# Patient Record
Sex: FEMALE | Race: WHITE
[De-identification: names, ages, dates, MRNs, and addresses within clinical notes are randomized per-mention and may not be internally consistent; named-entity substitution may affect disease eponyms.]

---

## 2018-01-08 ENCOUNTER — HOSPITAL ENCOUNTER (EMERGENCY)
Dept: HOSPITAL 15 - ER | Age: 83
Discharge: HOME | End: 2018-01-08
Payer: COMMERCIAL

## 2018-01-08 VITALS — BODY MASS INDEX: 29.08 KG/M2 | HEIGHT: 62 IN | WEIGHT: 158 LBS

## 2018-01-08 VITALS — DIASTOLIC BLOOD PRESSURE: 60 MMHG | SYSTOLIC BLOOD PRESSURE: 165 MMHG

## 2018-01-08 DIAGNOSIS — Z90.49: ICD-10-CM

## 2018-01-08 DIAGNOSIS — I10: ICD-10-CM

## 2018-01-08 DIAGNOSIS — Z90.710: ICD-10-CM

## 2018-01-08 DIAGNOSIS — R06.02: ICD-10-CM

## 2018-01-08 DIAGNOSIS — M10.9: ICD-10-CM

## 2018-01-08 DIAGNOSIS — R20.0: Primary | ICD-10-CM

## 2018-01-08 LAB
ALBUMIN SERPL-MCNC: 3.8 G/DL (ref 3.4–5)
ALP SERPL-CCNC: 118 U/L (ref 45–117)
ALT SERPL-CCNC: 23 U/L (ref 13–56)
ANION GAP SERPL CALCULATED.3IONS-SCNC: 4 MMOL/L (ref 5–15)
APTT PPP: 29 SEC (ref 22.64–33.71)
BILIRUB SERPL-MCNC: 0.3 MG/DL (ref 0.2–1)
BUN SERPL-MCNC: 33 MG/DL (ref 7–18)
BUN/CREAT SERPL: 23.9
CALCIUM SERPL-MCNC: 8.9 MG/DL (ref 8.5–10.1)
CHLORIDE SERPL-SCNC: 109 MMOL/L (ref 98–107)
CO2 SERPL-SCNC: 27 MMOL/L (ref 21–32)
GLUCOSE SERPL-MCNC: 84 MG/DL (ref 74–106)
HCT VFR BLD AUTO: 45.4 % (ref 36–46)
HGB BLD-MCNC: 15.1 G/DL (ref 12.2–16.2)
INR PPP: 0.92 (ref 0.9–1.15)
MCH RBC QN AUTO: 30.8 PG (ref 28–32)
MCV RBC AUTO: 92.4 FL (ref 80–100)
NRBC BLD QL AUTO: 0.1 %
POTASSIUM SERPL-SCNC: 4 MMOL/L (ref 3.5–5.1)
PROT SERPL-MCNC: 8 G/DL (ref 6.4–8.2)
PROTHROMBIN TIME: 10 SEC (ref 9.37–12.3)
SODIUM SERPL-SCNC: 140 MMOL/L (ref 136–145)

## 2018-01-08 PROCEDURE — 83880 ASSAY OF NATRIURETIC PEPTIDE: CPT

## 2018-01-08 PROCEDURE — 85025 COMPLETE CBC W/AUTO DIFF WBC: CPT

## 2018-01-08 PROCEDURE — 85610 PROTHROMBIN TIME: CPT

## 2018-01-08 PROCEDURE — 36415 COLL VENOUS BLD VENIPUNCTURE: CPT

## 2018-01-08 PROCEDURE — 85730 THROMBOPLASTIN TIME PARTIAL: CPT

## 2018-01-08 PROCEDURE — 84484 ASSAY OF TROPONIN QUANT: CPT

## 2018-01-08 PROCEDURE — 93005 ELECTROCARDIOGRAM TRACING: CPT

## 2018-01-08 PROCEDURE — 80053 COMPREHEN METABOLIC PANEL: CPT

## 2018-01-08 PROCEDURE — 73030 X-RAY EXAM OF SHOULDER: CPT

## 2020-11-20 ENCOUNTER — HOSPITAL ENCOUNTER (INPATIENT)
Dept: HOSPITAL 15 - ER | Age: 85
LOS: 3 days | Discharge: HOME | DRG: 390 | End: 2020-11-23
Attending: FAMILY MEDICINE | Admitting: NURSE PRACTITIONER
Payer: COMMERCIAL

## 2020-11-20 VITALS — WEIGHT: 146.61 LBS | HEIGHT: 60 IN | BODY MASS INDEX: 28.78 KG/M2

## 2020-11-20 DIAGNOSIS — N28.9: ICD-10-CM

## 2020-11-20 DIAGNOSIS — I10: ICD-10-CM

## 2020-11-20 DIAGNOSIS — M10.9: ICD-10-CM

## 2020-11-20 DIAGNOSIS — Z88.8: ICD-10-CM

## 2020-11-20 DIAGNOSIS — Z88.5: ICD-10-CM

## 2020-11-20 DIAGNOSIS — K56.600: Primary | ICD-10-CM

## 2020-11-20 DIAGNOSIS — Z90.710: ICD-10-CM

## 2020-11-20 DIAGNOSIS — Z80.3: ICD-10-CM

## 2020-11-20 DIAGNOSIS — Z90.49: ICD-10-CM

## 2020-11-20 DIAGNOSIS — E86.0: ICD-10-CM

## 2020-11-20 LAB
ALBUMIN SERPL-MCNC: 4.4 G/DL (ref 3.4–5)
ALP SERPL-CCNC: 132 U/L (ref 45–117)
ALT SERPL-CCNC: 28 U/L (ref 13–56)
ANION GAP SERPL CALCULATED.3IONS-SCNC: 7 MMOL/L (ref 5–15)
BILIRUB SERPL-MCNC: 0.7 MG/DL (ref 0.2–1)
BUN SERPL-MCNC: 23 MG/DL (ref 7–18)
BUN/CREAT SERPL: 18.3
CALCIUM SERPL-MCNC: 10.2 MG/DL (ref 8.5–10.1)
CHLORIDE SERPL-SCNC: 106 MMOL/L (ref 98–107)
CO2 SERPL-SCNC: 27 MMOL/L (ref 21–32)
GLUCOSE SERPL-MCNC: 116 MG/DL (ref 74–106)
HCT VFR BLD AUTO: 43.1 % (ref 36–46)
HGB BLD-MCNC: 14.7 G/DL (ref 12.2–16.2)
MCH RBC QN AUTO: 31.4 PG (ref 28–32)
MCV RBC AUTO: 92.3 FL (ref 80–100)
NRBC BLD QL AUTO: 0.1 %
POTASSIUM SERPL-SCNC: 3.6 MMOL/L (ref 3.5–5.1)
PROT SERPL-MCNC: 8.3 G/DL (ref 6.4–8.2)
SODIUM SERPL-SCNC: 140 MMOL/L (ref 136–145)

## 2020-11-20 PROCEDURE — 96361 HYDRATE IV INFUSION ADD-ON: CPT

## 2020-11-20 PROCEDURE — 80053 COMPREHEN METABOLIC PANEL: CPT

## 2020-11-20 PROCEDURE — 87081 CULTURE SCREEN ONLY: CPT

## 2020-11-20 PROCEDURE — 74250 X-RAY XM SM INT 1CNTRST STD: CPT

## 2020-11-20 PROCEDURE — 96374 THER/PROPH/DIAG INJ IV PUSH: CPT

## 2020-11-20 PROCEDURE — 36415 COLL VENOUS BLD VENIPUNCTURE: CPT

## 2020-11-20 PROCEDURE — 96375 TX/PRO/DX INJ NEW DRUG ADDON: CPT

## 2020-11-20 PROCEDURE — 85730 THROMBOPLASTIN TIME PARTIAL: CPT

## 2020-11-20 PROCEDURE — 74176 CT ABD & PELVIS W/O CONTRAST: CPT

## 2020-11-20 PROCEDURE — 85025 COMPLETE CBC W/AUTO DIFF WBC: CPT

## 2020-11-20 PROCEDURE — 74018 RADEX ABDOMEN 1 VIEW: CPT

## 2020-11-20 PROCEDURE — 83690 ASSAY OF LIPASE: CPT

## 2020-11-20 PROCEDURE — 93005 ELECTROCARDIOGRAM TRACING: CPT

## 2020-11-20 PROCEDURE — 85610 PROTHROMBIN TIME: CPT

## 2020-11-20 RX ADMIN — SODIUM CHLORIDE SCH MLS/HR: 0.9 INJECTION, SOLUTION INTRAVENOUS at 21:30

## 2020-11-21 VITALS — DIASTOLIC BLOOD PRESSURE: 63 MMHG | SYSTOLIC BLOOD PRESSURE: 131 MMHG

## 2020-11-21 VITALS — DIASTOLIC BLOOD PRESSURE: 59 MMHG | SYSTOLIC BLOOD PRESSURE: 126 MMHG

## 2020-11-21 VITALS — SYSTOLIC BLOOD PRESSURE: 121 MMHG | DIASTOLIC BLOOD PRESSURE: 61 MMHG

## 2020-11-21 VITALS — DIASTOLIC BLOOD PRESSURE: 62 MMHG | SYSTOLIC BLOOD PRESSURE: 148 MMHG

## 2020-11-21 LAB
ALBUMIN SERPL-MCNC: 3.5 G/DL (ref 3.4–5)
ALP SERPL-CCNC: 106 U/L (ref 45–117)
ALT SERPL-CCNC: 22 U/L (ref 13–56)
ANION GAP SERPL CALCULATED.3IONS-SCNC: 7 MMOL/L (ref 5–15)
APTT PPP: 29.9 SEC (ref 23–31.2)
BILIRUB SERPL-MCNC: 0.7 MG/DL (ref 0.2–1)
BUN SERPL-MCNC: 21 MG/DL (ref 7–18)
BUN/CREAT SERPL: 18.1
CALCIUM SERPL-MCNC: 8.9 MG/DL (ref 8.5–10.1)
CHLORIDE SERPL-SCNC: 110 MMOL/L (ref 98–107)
CO2 SERPL-SCNC: 25 MMOL/L (ref 21–32)
GLUCOSE SERPL-MCNC: 114 MG/DL (ref 74–106)
HCT VFR BLD AUTO: 38.8 % (ref 36–46)
HGB BLD-MCNC: 13.2 G/DL (ref 12.2–16.2)
INR PPP: 1.01 (ref 0.9–1.15)
MCH RBC QN AUTO: 31.4 PG (ref 28–32)
MCV RBC AUTO: 92.7 FL (ref 80–100)
NRBC BLD QL AUTO: 0 %
POTASSIUM SERPL-SCNC: 3.6 MMOL/L (ref 3.5–5.1)
PROT SERPL-MCNC: 6.7 G/DL (ref 6.4–8.2)
SODIUM SERPL-SCNC: 142 MMOL/L (ref 136–145)

## 2020-11-21 RX ADMIN — ENOXAPARIN SODIUM SCH MG: 30 INJECTION SUBCUTANEOUS at 11:01

## 2020-11-21 RX ADMIN — SODIUM CHLORIDE SCH MLS/HR: 0.9 INJECTION, SOLUTION INTRAVENOUS at 14:49

## 2020-11-21 RX ADMIN — SODIUM CHLORIDE SCH MLS/HR: 0.9 INJECTION, SOLUTION INTRAVENOUS at 00:16

## 2020-11-21 RX ADMIN — ONDANSETRON HYDROCHLORIDE PRN MG: 2 INJECTION, SOLUTION INTRAMUSCULAR; INTRAVENOUS at 14:49

## 2020-11-21 RX ADMIN — ONDANSETRON HYDROCHLORIDE PRN MG: 2 INJECTION, SOLUTION INTRAMUSCULAR; INTRAVENOUS at 08:55

## 2020-11-21 RX ADMIN — PANTOPRAZOLE SODIUM SCH MG: 40 INJECTION, POWDER, FOR SOLUTION INTRAVENOUS at 11:00

## 2020-11-21 RX ADMIN — SODIUM CHLORIDE SCH MLS/HR: 0.9 INJECTION, SOLUTION INTRAVENOUS at 23:00

## 2020-11-21 NOTE — NUR
Patient has vomited most of the Gastrografin, and continues to have nausea that is resolved 
temporarily with zofran, will continue to monitor.

## 2020-11-21 NOTE — NUR
Opening Shift Note

Received report from Maria C NARANJO. Assumed care of patient, awake and alert.  No S/S of 
distress/SOB or pain.  Instructed on POC and to call for assist PRN. Fall precaution 
measures in place, will continue to monitor for changes Q1hr and PRN.

## 2020-11-21 NOTE — NUR
Opening Note

Assumed care of patient, she was walking back to bed with CNA, no assist needed, steady 
gate. She is A & O x4.  POC discussed, patient c/o vomiting bile. Will medicate per orders. 
Otherwise patient is comfortable at this time. Will continue to monitor PRN.

## 2020-11-21 NOTE — NUR
Telemetry admit from ER



SILVIADINORA SOUSA admitted to Telemetry unit after SBAR not received.  Patient oriented to 
Tiny Saenz, RN primary RN, unit, room, bed, and unit policies regarding patient care and 
visiting hours. Patient now on continuous telemetry monitoring, tele box #32  and telemetry 
reading on arrival to unit is SR 72 . Patient weighed by bedscale and encouraged to call if 
they need something. Safety measures in place, bed in lowest locked position, bed rails 
raised x2, call light within reach. All questions and concerns addressed, patient verbalized 
understanding.

## 2020-11-21 NOTE — NUR
Dr. Casillas at bedside.

Orders for fluids and DNR, received, read back and verified. No home meds added at this 
time, patient still NPO and vomiting. Will continue to monitor

## 2020-11-22 VITALS — SYSTOLIC BLOOD PRESSURE: 147 MMHG | DIASTOLIC BLOOD PRESSURE: 69 MMHG

## 2020-11-22 VITALS — DIASTOLIC BLOOD PRESSURE: 76 MMHG | SYSTOLIC BLOOD PRESSURE: 150 MMHG

## 2020-11-22 VITALS — SYSTOLIC BLOOD PRESSURE: 146 MMHG | DIASTOLIC BLOOD PRESSURE: 77 MMHG

## 2020-11-22 VITALS — SYSTOLIC BLOOD PRESSURE: 148 MMHG | DIASTOLIC BLOOD PRESSURE: 82 MMHG

## 2020-11-22 VITALS — SYSTOLIC BLOOD PRESSURE: 144 MMHG | DIASTOLIC BLOOD PRESSURE: 78 MMHG

## 2020-11-22 RX ADMIN — ENOXAPARIN SODIUM SCH MG: 30 INJECTION SUBCUTANEOUS at 12:21

## 2020-11-22 RX ADMIN — SODIUM CHLORIDE SCH MLS/HR: 0.9 INJECTION, SOLUTION INTRAVENOUS at 14:45

## 2020-11-22 RX ADMIN — SODIUM CHLORIDE SCH MLS/HR: 0.9 INJECTION, SOLUTION INTRAVENOUS at 06:45

## 2020-11-22 RX ADMIN — LOSARTAN POTASSIUM SCH MG: 50 TABLET, FILM COATED ORAL at 12:21

## 2020-11-22 RX ADMIN — PANTOPRAZOLE SODIUM SCH MG: 40 INJECTION, POWDER, FOR SOLUTION INTRAVENOUS at 12:21

## 2020-11-22 RX ADMIN — SODIUM CHLORIDE SCH MLS/HR: 0.9 INJECTION, SOLUTION INTRAVENOUS at 22:50

## 2020-11-22 RX ADMIN — FUROSEMIDE SCH MG: 40 TABLET ORAL at 12:21

## 2020-11-22 RX ADMIN — LOSARTAN POTASSIUM SCH MG: 50 TABLET, FILM COATED ORAL at 22:15

## 2020-11-22 RX ADMIN — CARVEDILOL SCH MG: 12.5 TABLET, FILM COATED ORAL at 22:14

## 2020-11-22 NOTE — NUR
Opening Note

Assumed care of patient, she is A & O x4, no s/s of distress, is feeling much better, 
patient had 4 bowel movements last night, starting at 2130, patient has no more nausea, is 
feeling an appetite and would like some water and her home medications. Bed is in lowest, 
locked position, call light within reach. Will continue to monitor. Patient last had home 
meds Friday 11/19. Will continue to monitor.

## 2020-11-22 NOTE — NUR
Paged Dr. Cobb. 

Regarding results of the small bowel series, Dr. Casillas would like her to be cleared to go 
home. Will await return phone call.

## 2020-11-22 NOTE — NUR
Spoke to Dr. Casillas

Informed him of the results from the small bowel series, orders received for home meds, 
diet, and to page Dr. Cobb to inform of results, read back and verified. Will continue to 
monitor.

## 2020-11-22 NOTE — NUR
Opening Shift Note

Received report from Maria C NARANJO. Assumed care of patient, awake and alert.  No S/S of 
distress/SOB or pain.  Instructed on POC and to call for assist PRN. Fall precaution 
measures in place, however refused to wear fall risk band, will continue to monitor for 
changes Q1hr and PRN.

## 2020-11-23 VITALS — SYSTOLIC BLOOD PRESSURE: 134 MMHG | DIASTOLIC BLOOD PRESSURE: 68 MMHG

## 2020-11-23 VITALS — SYSTOLIC BLOOD PRESSURE: 143 MMHG | DIASTOLIC BLOOD PRESSURE: 60 MMHG

## 2020-11-23 VITALS — SYSTOLIC BLOOD PRESSURE: 146 MMHG | DIASTOLIC BLOOD PRESSURE: 72 MMHG

## 2020-11-23 RX ADMIN — LOSARTAN POTASSIUM SCH MG: 50 TABLET, FILM COATED ORAL at 10:09

## 2020-11-23 RX ADMIN — SODIUM CHLORIDE SCH MLS/HR: 0.9 INJECTION, SOLUTION INTRAVENOUS at 10:12

## 2020-11-23 RX ADMIN — CARVEDILOL SCH MG: 12.5 TABLET, FILM COATED ORAL at 10:10

## 2020-11-23 RX ADMIN — FUROSEMIDE SCH MG: 40 TABLET ORAL at 10:08

## 2020-11-23 RX ADMIN — ENOXAPARIN SODIUM SCH MG: 30 INJECTION SUBCUTANEOUS at 10:12

## 2020-11-23 RX ADMIN — PANTOPRAZOLE SODIUM SCH MG: 40 INJECTION, POWDER, FOR SOLUTION INTRAVENOUS at 10:08

## 2020-11-23 NOTE — NUR
Dr. JORDAN Casillas at bedside

MD at bedside discussing plan of care with patient and this RN. Patient to discharge home 
later today. Patient verbalized understanding. Will continue to monitor Q1 hour and PRN.

## 2020-11-23 NOTE — NUR
Discharge 

Discharge instructions given as ordered. Encourage to follow up with primary care physician 
and GI doctor in [place] as instructed. All questions and concerns addressed. Patient 
verbalized understanding.  Medication reconciliation form completed and copy given to 
patient. IV catheter removed, catheter intact, pressure dressing applied. Telemetry monitor 
removed and sent back to ICU. Patient taken down to private vehicle via wheelchair with all 
personal belongings. No signs or symptoms of distress noted at this time.

## 2020-11-23 NOTE — NUR
Opening Note

Received report from night shift RN. Patient is awake, alert and oriented x4. No signs or 
symptoms of distress noted at this time. Patient is on room air, respirations even and 
unlabored. Patient denies pain at this time.Reviewed plan of care with patient, patient 
verbalized understanding. Bed in low and locked position, call light within reach. Will 
continue to monitor Q1 hour and PRN.

## 2023-01-05 NOTE — NUR
Patient vomited but refused to take medication, will continue to monitor. [5] : 5 [0] : 0 [Dull/Aching] : dull/aching [Sharp] : sharp [Constant] : constant [de-identified] : 1/5/2023: 54yo f with right knee pain ongoing for months. Reports intermittent sewlling. Pain became worse last week after playing pickle ball. Pain is worse with standing from a seated position. Has a feeling on instability. Wears a brace for support. Takes Advil with some relief.  [] : no [FreeTextEntry5] : pt is 53 years old female who present evaluation of the right knee pt states she has been experiencing pain but after she play a game it white ,me worse